# Patient Record
Sex: MALE | Race: ASIAN | NOT HISPANIC OR LATINO | URBAN - METROPOLITAN AREA
[De-identification: names, ages, dates, MRNs, and addresses within clinical notes are randomized per-mention and may not be internally consistent; named-entity substitution may affect disease eponyms.]

---

## 2018-06-25 ENCOUNTER — EMERGENCY (EMERGENCY)
Facility: HOSPITAL | Age: 43
LOS: 0 days | Discharge: HOME | End: 2018-06-25
Attending: EMERGENCY MEDICINE
Payer: COMMERCIAL

## 2018-06-25 VITALS
OXYGEN SATURATION: 98 % | DIASTOLIC BLOOD PRESSURE: 92 MMHG | SYSTOLIC BLOOD PRESSURE: 145 MMHG | RESPIRATION RATE: 20 BRPM | HEIGHT: 67 IN | HEART RATE: 107 BPM | WEIGHT: 190.04 LBS | TEMPERATURE: 100 F

## 2018-06-25 DIAGNOSIS — M54.9 DORSALGIA, UNSPECIFIED: ICD-10-CM

## 2018-06-25 DIAGNOSIS — S43.401A UNSPECIFIED SPRAIN OF RIGHT SHOULDER JOINT, INITIAL ENCOUNTER: ICD-10-CM
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
## 2018-06-25 DIAGNOSIS — M25.511 PAIN IN RIGHT SHOULDER: ICD-10-CM

## 2018-06-25 DIAGNOSIS — S39.012A STRAIN OF MUSCLE, FASCIA AND TENDON OF LOWER BACK, INITIAL ENCOUNTER: ICD-10-CM
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
## 2018-06-25 PROCEDURE — 99284 EMERGENCY DEPT VISIT MOD MDM: CPT

## 2018-06-25 PROCEDURE — 72100 X-RAY EXAM L-S SPINE 2/3 VWS: CPT | Mod: 26

## 2018-06-25 PROCEDURE — 73030 X-RAY EXAM OF SHOULDER: CPT | Mod: 26,RT

## 2018-06-25 RX ORDER — METHOCARBAMOL 500 MG/1
1 TABLET, FILM COATED ORAL
Qty: 14 | Refills: 0 | OUTPATIENT
Start: 2018-06-25 | End: 2018-07-01

## 2018-06-25 RX ORDER — METHOCARBAMOL 500 MG/1
750 TABLET, FILM COATED ORAL ONCE
Qty: 0 | Refills: 0 | Status: COMPLETED | OUTPATIENT
Start: 2018-06-25 | End: 2018-06-25

## 2018-06-25 RX ORDER — KETOROLAC TROMETHAMINE 30 MG/ML
30 SYRINGE (ML) INJECTION ONCE
Qty: 0 | Refills: 0 | Status: DISCONTINUED | OUTPATIENT
Start: 2018-06-25 | End: 2018-06-25

## 2018-06-25 RX ADMIN — METHOCARBAMOL 750 MILLIGRAM(S): 500 TABLET, FILM COATED ORAL at 17:26

## 2018-06-25 RX ADMIN — Medication 30 MILLIGRAM(S): at 17:53

## 2018-06-25 RX ADMIN — Medication 30 MILLIGRAM(S): at 17:26
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
## 2018-06-25 NOTE — ED PROVIDER NOTE - MUSCULOSKELETAL, MLM
Spine appears normal, range of motion is not limited, right anterior shoulder and right lateral lumbar tenderness
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
## 2019-06-24 NOTE — ED PROVIDER NOTE - GASTROINTESTINAL, MLM
Normal rate, regular rhythm, normal S1, S2 heart sounds heard. Abdomen soft, non-tender, no guarding.

## 2019-09-06 ENCOUNTER — EMERGENCY (EMERGENCY)
Facility: HOSPITAL | Age: 44
LOS: 0 days | Discharge: HOME | End: 2019-09-06
Attending: EMERGENCY MEDICINE
Payer: SELF-PAY

## 2019-09-06 VITALS
TEMPERATURE: 98 F | RESPIRATION RATE: 18 BRPM | OXYGEN SATURATION: 98 % | SYSTOLIC BLOOD PRESSURE: 130 MMHG | HEART RATE: 100 BPM | DIASTOLIC BLOOD PRESSURE: 89 MMHG

## 2019-09-06 VITALS — RESPIRATION RATE: 20 BRPM | HEART RATE: 109 BPM | TEMPERATURE: 99 F | OXYGEN SATURATION: 98 %

## 2019-09-06 DIAGNOSIS — Z63.0 PROBLEMS IN RELATIONSHIP WITH SPOUSE OR PARTNER: ICD-10-CM

## 2019-09-06 DIAGNOSIS — F32.2 MAJOR DEPRESSIVE DISORDER, SINGLE EPISODE, SEVERE WITHOUT PSYCHOTIC FEATURES: ICD-10-CM

## 2019-09-06 DIAGNOSIS — F32.9 MAJOR DEPRESSIVE DISORDER, SINGLE EPISODE, UNSPECIFIED: ICD-10-CM

## 2019-09-06 DIAGNOSIS — R73.9 HYPERGLYCEMIA, UNSPECIFIED: ICD-10-CM

## 2019-09-06 LAB
ALBUMIN SERPL ELPH-MCNC: 3.8 G/DL — SIGNIFICANT CHANGE UP (ref 3.3–5)
ALP SERPL-CCNC: 81 U/L — SIGNIFICANT CHANGE UP (ref 40–120)
ALT FLD-CCNC: 19 U/L — SIGNIFICANT CHANGE UP (ref 12–78)
AMPHET UR-MCNC: NEGATIVE — SIGNIFICANT CHANGE UP
ANION GAP SERPL CALC-SCNC: 10 MMOL/L — SIGNIFICANT CHANGE UP (ref 5–17)
APAP SERPL-MCNC: < 2 UG/ML (ref 10–30)
APPEARANCE UR: CLEAR — SIGNIFICANT CHANGE UP
AST SERPL-CCNC: 9 U/L — LOW (ref 15–37)
BARBITURATES UR SCN-MCNC: NEGATIVE — SIGNIFICANT CHANGE UP
BASOPHILS # BLD AUTO: 0.02 K/UL — SIGNIFICANT CHANGE UP (ref 0–0.2)
BASOPHILS NFR BLD AUTO: 0.3 % — SIGNIFICANT CHANGE UP (ref 0–2)
BENZODIAZ UR-MCNC: NEGATIVE — SIGNIFICANT CHANGE UP
BILIRUB SERPL-MCNC: 0.4 MG/DL — SIGNIFICANT CHANGE UP (ref 0.2–1.2)
BILIRUB UR-MCNC: NEGATIVE — SIGNIFICANT CHANGE UP
BUN SERPL-MCNC: 10 MG/DL — SIGNIFICANT CHANGE UP (ref 7–23)
CALCIUM SERPL-MCNC: 8.8 MG/DL — SIGNIFICANT CHANGE UP (ref 8.5–10.1)
CHLORIDE SERPL-SCNC: 101 MMOL/L — SIGNIFICANT CHANGE UP (ref 96–108)
CO2 SERPL-SCNC: 25 MMOL/L — SIGNIFICANT CHANGE UP (ref 22–31)
COCAINE METAB.OTHER UR-MCNC: NEGATIVE — SIGNIFICANT CHANGE UP
COLOR SPEC: YELLOW — SIGNIFICANT CHANGE UP
CREAT SERPL-MCNC: 1 MG/DL — SIGNIFICANT CHANGE UP (ref 0.5–1.3)
DIFF PNL FLD: NEGATIVE — SIGNIFICANT CHANGE UP
EOSINOPHIL # BLD AUTO: 0.16 K/UL — SIGNIFICANT CHANGE UP (ref 0–0.5)
EOSINOPHIL NFR BLD AUTO: 2.5 % — SIGNIFICANT CHANGE UP (ref 0–6)
ETHANOL SERPL-MCNC: <10 MG/DL — SIGNIFICANT CHANGE UP (ref 0–10)
GLUCOSE BLDC GLUCOMTR-MCNC: 309 MG/DL — HIGH (ref 70–99)
GLUCOSE SERPL-MCNC: 431 MG/DL — HIGH (ref 70–99)
GLUCOSE UR QL: 1000 MG/DL
HCT VFR BLD CALC: 43.4 % — SIGNIFICANT CHANGE UP (ref 39–50)
HGB BLD-MCNC: 15 G/DL — SIGNIFICANT CHANGE UP (ref 13–17)
IMM GRANULOCYTES NFR BLD AUTO: 0.3 % — SIGNIFICANT CHANGE UP (ref 0–1.5)
KETONES UR-MCNC: ABNORMAL
LEUKOCYTE ESTERASE UR-ACNC: NEGATIVE — SIGNIFICANT CHANGE UP
LYMPHOCYTES # BLD AUTO: 2.61 K/UL — SIGNIFICANT CHANGE UP (ref 1–3.3)
LYMPHOCYTES # BLD AUTO: 40.2 % — SIGNIFICANT CHANGE UP (ref 13–44)
MCHC RBC-ENTMCNC: 29.8 PG — SIGNIFICANT CHANGE UP (ref 27–34)
MCHC RBC-ENTMCNC: 34.6 GM/DL — SIGNIFICANT CHANGE UP (ref 32–36)
MCV RBC AUTO: 86.3 FL — SIGNIFICANT CHANGE UP (ref 80–100)
METHADONE UR-MCNC: NEGATIVE — SIGNIFICANT CHANGE UP
MONOCYTES # BLD AUTO: 0.35 K/UL — SIGNIFICANT CHANGE UP (ref 0–0.9)
MONOCYTES NFR BLD AUTO: 5.4 % — SIGNIFICANT CHANGE UP (ref 2–14)
NEUTROPHILS # BLD AUTO: 3.34 K/UL — SIGNIFICANT CHANGE UP (ref 1.8–7.4)
NEUTROPHILS NFR BLD AUTO: 51.3 % — SIGNIFICANT CHANGE UP (ref 43–77)
NITRITE UR-MCNC: NEGATIVE — SIGNIFICANT CHANGE UP
NRBC # BLD: 0 /100 WBCS — SIGNIFICANT CHANGE UP (ref 0–0)
OPIATES UR-MCNC: NEGATIVE — SIGNIFICANT CHANGE UP
PCP SPEC-MCNC: SIGNIFICANT CHANGE UP
PCP UR-MCNC: NEGATIVE — SIGNIFICANT CHANGE UP
PH UR: 7 — SIGNIFICANT CHANGE UP (ref 5–8)
PLATELET # BLD AUTO: 251 K/UL — SIGNIFICANT CHANGE UP (ref 150–400)
POTASSIUM SERPL-MCNC: 4.7 MMOL/L — SIGNIFICANT CHANGE UP (ref 3.5–5.3)
POTASSIUM SERPL-SCNC: 4.7 MMOL/L — SIGNIFICANT CHANGE UP (ref 3.5–5.3)
PROT SERPL-MCNC: 7.8 GM/DL — SIGNIFICANT CHANGE UP (ref 6–8.3)
PROT UR-MCNC: NEGATIVE MG/DL — SIGNIFICANT CHANGE UP
RBC # BLD: 5.03 M/UL — SIGNIFICANT CHANGE UP (ref 4.2–5.8)
RBC # FLD: 11.9 % — SIGNIFICANT CHANGE UP (ref 10.3–14.5)
SALICYLATES SERPL-MCNC: <1.7 MG/DL — LOW (ref 2.8–20)
SODIUM SERPL-SCNC: 136 MMOL/L — SIGNIFICANT CHANGE UP (ref 135–145)
SP GR SPEC: 1.01 — SIGNIFICANT CHANGE UP (ref 1.01–1.02)
THC UR QL: NEGATIVE — SIGNIFICANT CHANGE UP
TSH SERPL-MCNC: 1.35 UU/ML — SIGNIFICANT CHANGE UP (ref 0.36–3.74)
UROBILINOGEN FLD QL: NEGATIVE MG/DL — SIGNIFICANT CHANGE UP
WBC # BLD: 6.5 K/UL — SIGNIFICANT CHANGE UP (ref 3.8–10.5)
WBC # FLD AUTO: 6.5 K/UL — SIGNIFICANT CHANGE UP (ref 3.8–10.5)

## 2019-09-06 PROCEDURE — 90792 PSYCH DIAG EVAL W/MED SRVCS: CPT | Mod: GT

## 2019-09-06 PROCEDURE — 93010 ELECTROCARDIOGRAM REPORT: CPT

## 2019-09-06 PROCEDURE — 99284 EMERGENCY DEPT VISIT MOD MDM: CPT

## 2019-09-06 RX ORDER — METFORMIN HYDROCHLORIDE 850 MG/1
500 TABLET ORAL ONCE
Refills: 0 | Status: COMPLETED | OUTPATIENT
Start: 2019-09-06 | End: 2019-09-06

## 2019-09-06 RX ORDER — INSULIN HUMAN 100 [IU]/ML
5 INJECTION, SOLUTION SUBCUTANEOUS ONCE
Refills: 0 | Status: DISCONTINUED | OUTPATIENT
Start: 2019-09-06 | End: 2019-09-06

## 2019-09-06 RX ORDER — METFORMIN HYDROCHLORIDE 850 MG/1
1 TABLET ORAL
Qty: 30 | Refills: 0
Start: 2019-09-06 | End: 2019-09-20

## 2019-09-06 RX ADMIN — METFORMIN HYDROCHLORIDE 500 MILLIGRAM(S): 850 TABLET ORAL at 17:37

## 2019-09-06 SDOH — SOCIAL STABILITY - SOCIAL INSECURITY: PROBLEMS IN RELATIONSHIP WITH SPOUSE OR PARTNER: Z63.0

## 2019-09-06 NOTE — ED ADULT TRIAGE NOTE - CHIEF COMPLAINT QUOTE
Pt walked c/o feeling depressed for the pat 2 weeks getting worse, pt states that his wife recently  left him on 8/25/19,  pt also states that he's been having suicidal thoughts denies any active plans

## 2019-09-06 NOTE — ED PROVIDER NOTE - PATIENT PORTAL LINK FT
You can access the FollowMyHealth Patient Portal offered by [facility] by registering at the following website: [email or web address]. By joining [facility]’s FollowMyHealth portal, you will also be able to view your health information using other applications (apps) compatible with our system.

## 2019-09-06 NOTE — ED BEHAVIORAL HEALTH ASSESSMENT NOTE - RISK ASSESSMENT
Acute Suicide Risk  (  ) High   ( x ) Moderate   (  ) Low   (  ) Unable to determine   Rationale: Details: Risks include recent psychosocial stressors, minimal social supports, untreated depression/anxiety symptoms, poor sleep. However he appears to be treatment seeking as he presented himself to the hospital today asking for help, seems motivated to follow recommendations and seek outpatient mental health treatment in community, willing to increase supports from his son who will be picking him up from the hospital today    Elevated Chronic Risk   (  ) Yes ___________  Details ___________  (  ) No   ___________    Additional Suicide Risk Factors (select all that apply)  [  ]Access to lethal means including firearms  [  ]Family history of suicide  [  ]Impulsivity  [  ] Current or past mood disorder  [  ] Current or past psychotic disorder  [  ] Current or past PTSD  [  ] Current or past ADHD  [  ] Current or past TBI  [  ] Current or past cluster B personality disorder or traits  [  ] Current or past conduct problems  [  ] Recent onset of current or past psychiatric disorder  [  ] Family history of psychiatric diagnoses requiring hospitalization     Additional Activating Events (select all that apply)  [  ]Perceived burden on family or others  [  ]Current sexual or physical abuse  [  ]Substance intoxication or withdrawal  [  ]Inadequate social supports  [  ]Hopeless about or dissatisfied with current provider or treatment     Additional Protective Factors (select all that apply)  [  ] Future plans  [  ] Church beliefs  [  ] Beloved pets Acute Suicide Risk  (  ) High   ( x ) Moderate   (  ) Low   (  ) Unable to determine   Rationale: Details: Risks include recent psychosocial stressors, minimal social supports, untreated depression/anxiety symptoms, poor sleep. However he appears to be treatment seeking as he presented himself to the hospital today asking for help, seems motivated to follow recommendations and seek outpatient mental health treatment in community, willing to increase supports from his son who will be picking him up from the hospital today    Elevated Chronic Risk   ( x ) Yes ___________  Details: age, male gender, single  (  ) No   ___________    Additional Suicide Risk Factors (select all that apply)  [  ]Access to lethal means including firearms  [  ]Family history of suicide  [  ]Impulsivity  [  ] Current or past mood disorder  [  ] Current or past psychotic disorder  [  ] Current or past PTSD  [  ] Current or past ADHD  [  ] Current or past TBI  [  ] Current or past cluster B personality disorder or traits  [  ] Current or past conduct problems  [  ] Recent onset of current or past psychiatric disorder  [  ] Family history of psychiatric diagnoses requiring hospitalization     Additional Activating Events (select all that apply)  [  ]Perceived burden on family or others  [  ]Current sexual or physical abuse  [  ]Substance intoxication or withdrawal  [  ]Inadequate social supports  [  ]Hopeless about or dissatisfied with current provider or treatment     Additional Protective Factors (select all that apply)  [ x ] Future plans  [ x ] Shinto beliefs  [  ] Beloved pets

## 2019-09-06 NOTE — ED PROVIDER NOTE - OBJECTIVE STATEMENT
44 year old male who denies PMH presenting to ED due to depression - states has been feeling sad since his wife left him 2-3 weeks ago with another man. Pt works as a  and has no friend or family to speak with about this issue - his son is in his 20s but he has not spoke to him about issue. Has been having feeling of sadness, anger and thoughts of self harm without any plans at time to harm himself.

## 2019-09-06 NOTE — ED ADULT NURSE NOTE - OBJECTIVE STATEMENT
Pt a&o x3, pt came to ER today c/o of thoughts of suicide and depression. As per pt " I feel like I want to kill myself". Pt states depression began 2 weeks ago. Pt states that his wife recently  left him on 8/25/19 he is now living home alone. Pmh Lumb ar spine surgery 4 months ago. pt Denies any active plan, homicidal thoughts, previous pysch history, any previous suicidal attempt. Pt appears calm , cooperative , maintains good eye contact. 1:1 initiate dpt in gown, belongings collected and security paged. Pt denies any hallucinations

## 2019-09-06 NOTE — ED BEHAVIORAL HEALTH ASSESSMENT NOTE - MODIFICATIONS
provider wrote this note and saw patient, accompanied by medical student who conducted some of the interview

## 2019-09-06 NOTE — ED BEHAVIORAL HEALTH ASSESSMENT NOTE - HPI (INCLUDE ILLNESS QUALITY, SEVERITY, DURATION, TIMING, CONTEXT, MODIFYING FACTORS, ASSOCIATED SIGNS AND SYMPTOMS)
Pt is a 43yo Jordanian  (but ) male, domiciled alone, employed by [employer], has one son, one daughter, one step son, no significant past psychiatric history, no significant medical history, who self presents to ED for depression and anxiety symptoms including passive suicidal ideation in the setting of marital conflict, recent separation from his wife about two weeks ago. Reports distressing symptoms starting about 2-3 weeks ago including anhedonia, hopelessness, sadness, tearfulness, disruption of sleep, rumination/worry, anxiety/restlessness, passive thoughts of "not wanting to go on," but denies intent, denies plan, stating that he has "too many responsibilities" to family, as primary income in family, supporting his daughter through medical school in [place], etc. Reports since 2017, patient and his wife have been drifting apart, having conflicts around her change in lifestyle -- reportedly in 2017 she started using drugs and went to detention briefly. Patient reports he is Congregational, has never used alcohol or any substances. About two weeks ago, he discovered his wife was with another man, which triggered this current decompensation. He denies AVH, denies HI, denies manic symptoms. Not currently seeing psychiatrist or on psych meds.     Collateral from patient's son Donis Silverman [phone]: was aware that his father was having a very difficult time but was surprised he went to ED. does not feel his father would do anything to hurt himself because he feels so much responsibility toward his family. denies knowledge about any past self harming or SI, past psych history or other acute concerns. agrees to meet father at the ED and spend time with patient and monitor for changes or concerns.

## 2019-09-06 NOTE — ED BEHAVIORAL HEALTH ASSESSMENT NOTE - SUICIDE PROTECTIVE FACTORS
Responsibility to family and others/Supportive social network or family/High spirituality/Identifies reasons for living/Future oriented/Engaged in work or school

## 2019-09-06 NOTE — ED BEHAVIORAL HEALTH ASSESSMENT NOTE - DETAILS
previously lived with wife,  two weeks ago, now lives alone no SI in past, no past self harm, no past suicide attempts none discussed plan

## 2019-09-06 NOTE — ED BEHAVIORAL HEALTH ASSESSMENT NOTE - SUMMARY
43yo   (but ) male, domiciled alone, employed by [employer], has one son, one daughter, one step son, no significant past psychiatric history, no significant medical history, who self presents to ED for depression and anxiety symptoms including passive suicidal ideation in the setting of marital conflict, recent separation from his wife about two weeks ago. Patient does appear depressed, anxious, asking for help with sleep in particular. No suicide intent or plan, reports that he has too many responsibilities to his family as primary income. Upon speaking with his adult son, collateral is reassuring that psych hospitalization would be encouraged but since patient declines, outpatient mental health care would be appropriate. Discussed with patient and patient's son that should there be any concern for decompensation, SI, etc, to call 911 or go to nearest ED for help. Currently at moderate risk for suicide 45yo   (but ) male, domiciled alone, employed by [employer], has one son, one daughter, one step son, no significant past psychiatric history, no significant medical history, who self presents to ED for depression and anxiety symptoms including passive suicidal ideation in the setting of marital conflict, recent separation from his wife about two weeks ago. Patient does appear depressed, anxious, asking for help with sleep in particular. No suicide intent or plan, reports that he has too many responsibilities to his family as primary income. Upon speaking with his adult son, collateral is reassuring that psych hospitalization would be encouraged but since patient declines, outpatient mental health care would be appropriate. Discussed with patient and patient's son that should there be any concern for decompensation, SI, etc, to call 911 or go to nearest ED for help. Currently at moderate risk for suicide due to recent psychosocial stressors, minimal social supports, untreated depression/anxiety symptoms, poor sleep. However he appears to be treatment seeking as he presented himself to the hospital today asking for help, seems motivated to follow recommendations and seek outpatient mental health treatment in community, willing to increase supports from his son who will be picking him up from the hospital today.

## 2019-09-06 NOTE — ED BEHAVIORAL HEALTH ASSESSMENT NOTE - DIFFERENTIAL
C-SSRS Screener     1. Have you ever wished to be dead or wished you could go to sleep and not wake up?  [ x ]Yes, [  ]No, [  ]Unable to Assess     2. Have you actually had any thoughts of killing yourself?   [  ]Yes, [ x ]No, [  ]Unable to Assess     If answer is “No” for 1 and 2, stop here. If answer is “Yes” to 1 or 2, proceed to 3.     3. Have you been thinking about how you might kill yourself?  [  ]Yes, [ x ]No, [  ]Unable to Assess     4. Have you had these thoughts and had some intention of acting on them?  [  ]Yes, [ x ]No, [  ]Unable to Assess     5. Have you started to work out or worked out the details of how to kill yourself? Do you intend to carry out this plan?  [  ]Yes, [ x ]No, [  ]Unable to Assess     6. Have you ever done anything, started to do anything, or prepared to do anything to end your life? If so, was it in the past 3 months?  [  ]Yes, [ x ]No, [  ]Unable to Assess

## 2019-09-06 NOTE — ED BEHAVIORAL HEALTH ASSESSMENT NOTE - DESCRIPTION
Pt cooperative in ED. Self presented. Did not require any psych medications in ED. Following instructions. ?DM2, ?HTN Lived with wife of 10 years until she moved out abruptly about two weeks ago, the two are  apparently. Has two twin children, 21yo son and daughter, also 11yo step son. Now patient lives alone. Works as [employer]  for past ten years. Other family including daughter live in [place]. He is a practicing Rastafarian.

## 2019-09-06 NOTE — ED PROVIDER NOTE - CLINICAL SUMMARY MEDICAL DECISION MAKING FREE TEXT BOX
pt with suicidal ideation noted - tele-psych to evaluate - pt with suicidal ideation noted - tele-psych to evaluate - states may follow up out patient for suicidal thoughts and depression. Will also give follow up for new onset DM II without anion gap, will start on Metformin 500mg BID.

## 2019-09-06 NOTE — ED BEHAVIORAL HEALTH ASSESSMENT NOTE - OTHER PAST PSYCHIATRIC HISTORY (INCLUDE DETAILS REGARDING ONSET, COURSE OF ILLNESS, INPATIENT/OUTPATIENT TREATMENT)
No past psych history. No psychotropic meds in past although on PSYCKES, sertraline and paroxetine prescribed in 2015-2016Patient reports not recalling ever being prescribed or taking these medications. No psych hospitalizations in past.

## 2019-10-01 PROBLEM — Z00.00 ENCOUNTER FOR PREVENTIVE HEALTH EXAMINATION: Status: ACTIVE | Noted: 2019-10-01

## 2019-10-02 ENCOUNTER — APPOINTMENT (OUTPATIENT)
Dept: INTERNAL MEDICINE | Facility: CLINIC | Age: 44
End: 2019-10-02

## 2019-11-18 ENCOUNTER — APPOINTMENT (OUTPATIENT)
Dept: INTERNAL MEDICINE | Facility: CLINIC | Age: 44
End: 2019-11-18

## 2022-01-27 NOTE — ED ADULT NURSE NOTE - NS TRANSFER PATIENT BELONGINGS
Frequent recurrent febrile episodes of otitis not clearing with antibiotics    Recommend BMT Clothing

## 2022-05-06 NOTE — ED BEHAVIORAL HEALTH ASSESSMENT NOTE - MUSCLE TONE / STRENGTH
Elidel Pregnancy And Lactation Text: This medication is Pregnancy Category C. It is unknown if this medication is excreted in breast milk. Normal muscle tone/strength

## 2022-09-20 ENCOUNTER — EMERGENCY (EMERGENCY)
Facility: HOSPITAL | Age: 47
LOS: 0 days | Discharge: HOME | End: 2022-09-20
Attending: EMERGENCY MEDICINE

## 2022-09-20 ENCOUNTER — TRANSCRIPTION ENCOUNTER (OUTPATIENT)
Age: 47
End: 2022-09-20

## 2022-09-20 VITALS
DIASTOLIC BLOOD PRESSURE: 60 MMHG | RESPIRATION RATE: 16 BRPM | TEMPERATURE: 98 F | SYSTOLIC BLOOD PRESSURE: 112 MMHG | OXYGEN SATURATION: 99 % | HEART RATE: 74 BPM

## 2022-09-20 VITALS
RESPIRATION RATE: 18 BRPM | HEIGHT: 67 IN | WEIGHT: 179.9 LBS | DIASTOLIC BLOOD PRESSURE: 90 MMHG | SYSTOLIC BLOOD PRESSURE: 133 MMHG | HEART RATE: 135 BPM | OXYGEN SATURATION: 96 % | TEMPERATURE: 101 F

## 2022-09-20 LAB
ALBUMIN SERPL ELPH-MCNC: 3.7 G/DL — SIGNIFICANT CHANGE UP (ref 3.3–5)
ALP SERPL-CCNC: 76 U/L — SIGNIFICANT CHANGE UP (ref 40–120)
ALT FLD-CCNC: 27 U/L — SIGNIFICANT CHANGE UP (ref 12–78)
ANION GAP SERPL CALC-SCNC: 7 MMOL/L — SIGNIFICANT CHANGE UP (ref 5–17)
APPEARANCE UR: CLEAR — SIGNIFICANT CHANGE UP
APTT BLD: 32.8 SEC — SIGNIFICANT CHANGE UP (ref 27.5–35.5)
AST SERPL-CCNC: 21 U/L — SIGNIFICANT CHANGE UP (ref 15–37)
BASE EXCESS BLDV CALC-SCNC: -0.2 MMOL/L — SIGNIFICANT CHANGE UP (ref -2–3)
BASOPHILS # BLD AUTO: 0.02 K/UL — SIGNIFICANT CHANGE UP (ref 0–0.2)
BASOPHILS NFR BLD AUTO: 0.2 % — SIGNIFICANT CHANGE UP (ref 0–2)
BILIRUB SERPL-MCNC: 0.3 MG/DL — SIGNIFICANT CHANGE UP (ref 0.2–1.2)
BILIRUB UR-MCNC: NEGATIVE — SIGNIFICANT CHANGE UP
BLOOD GAS COMMENTS, VENOUS: SIGNIFICANT CHANGE UP
BUN SERPL-MCNC: 6 MG/DL — LOW (ref 7–23)
CALCIUM SERPL-MCNC: 9.1 MG/DL — SIGNIFICANT CHANGE UP (ref 8.5–10.1)
CHLORIDE BLDV-SCNC: 103 MMOL/L — SIGNIFICANT CHANGE UP (ref 98–107)
CHLORIDE SERPL-SCNC: 106 MMOL/L — SIGNIFICANT CHANGE UP (ref 96–108)
CO2 BLDV-SCNC: 25 MMOL/L — SIGNIFICANT CHANGE UP (ref 22–26)
CO2 SERPL-SCNC: 24 MMOL/L — SIGNIFICANT CHANGE UP (ref 22–31)
COLOR SPEC: YELLOW — SIGNIFICANT CHANGE UP
CREAT SERPL-MCNC: 0.85 MG/DL — SIGNIFICANT CHANGE UP (ref 0.5–1.3)
D DIMER BLD IA.RAPID-MCNC: <150 NG/ML DDU — SIGNIFICANT CHANGE UP
DIFF PNL FLD: NEGATIVE — SIGNIFICANT CHANGE UP
EGFR: 107 ML/MIN/1.73M2 — SIGNIFICANT CHANGE UP
EOSINOPHIL # BLD AUTO: 0.1 K/UL — SIGNIFICANT CHANGE UP (ref 0–0.5)
EOSINOPHIL NFR BLD AUTO: 1 % — SIGNIFICANT CHANGE UP (ref 0–6)
GAS PNL BLDV: 133 MMOL/L — LOW (ref 136–145)
GAS PNL BLDV: SIGNIFICANT CHANGE UP
GAS PNL BLDV: SIGNIFICANT CHANGE UP
GLUCOSE BLDC GLUCOMTR-MCNC: 121 MG/DL — HIGH (ref 70–99)
GLUCOSE BLDV-MCNC: 147 MG/DL — HIGH (ref 65–95)
GLUCOSE SERPL-MCNC: 136 MG/DL — HIGH (ref 70–99)
GLUCOSE UR QL: NEGATIVE MG/DL — SIGNIFICANT CHANGE UP
HCO3 BLDV-SCNC: 24 MMOL/L — SIGNIFICANT CHANGE UP (ref 22–28)
HCT VFR BLD CALC: 42.2 % — SIGNIFICANT CHANGE UP (ref 39–50)
HCT VFR BLDA CALC: 43 % — SIGNIFICANT CHANGE UP (ref 37–47)
HGB BLD CALC-MCNC: 14.4 G/DL — SIGNIFICANT CHANGE UP (ref 12.6–17.4)
HGB BLD-MCNC: 14 G/DL — SIGNIFICANT CHANGE UP (ref 13–17)
IMM GRANULOCYTES NFR BLD AUTO: 0.3 % — SIGNIFICANT CHANGE UP (ref 0–0.9)
INR BLD: 1.15 RATIO — SIGNIFICANT CHANGE UP (ref 0.88–1.16)
KETONES UR-MCNC: NEGATIVE — SIGNIFICANT CHANGE UP
LACTATE BLDV-MCNC: 1.4 MMOL/L — HIGH (ref 0.56–1.39)
LACTATE SERPL-SCNC: 1.1 MMOL/L — SIGNIFICANT CHANGE UP (ref 0.7–2)
LEUKOCYTE ESTERASE UR-ACNC: NEGATIVE — SIGNIFICANT CHANGE UP
LYMPHOCYTES # BLD AUTO: 2.29 K/UL — SIGNIFICANT CHANGE UP (ref 1–3.3)
LYMPHOCYTES # BLD AUTO: 23.7 % — SIGNIFICANT CHANGE UP (ref 13–44)
MCHC RBC-ENTMCNC: 29.7 PG — SIGNIFICANT CHANGE UP (ref 27–34)
MCHC RBC-ENTMCNC: 33.2 G/DL — SIGNIFICANT CHANGE UP (ref 32–36)
MCV RBC AUTO: 89.4 FL — SIGNIFICANT CHANGE UP (ref 80–100)
MONOCYTES # BLD AUTO: 0.72 K/UL — SIGNIFICANT CHANGE UP (ref 0–0.9)
MONOCYTES NFR BLD AUTO: 7.4 % — SIGNIFICANT CHANGE UP (ref 2–14)
NEUTROPHILS # BLD AUTO: 6.51 K/UL — SIGNIFICANT CHANGE UP (ref 1.8–7.4)
NEUTROPHILS NFR BLD AUTO: 67.4 % — SIGNIFICANT CHANGE UP (ref 43–77)
NITRITE UR-MCNC: NEGATIVE — SIGNIFICANT CHANGE UP
NRBC # BLD: 0 /100 WBCS — SIGNIFICANT CHANGE UP (ref 0–0)
PCO2 BLDV: 37 MMHG — LOW (ref 42–55)
PH BLDV: 7.42 — SIGNIFICANT CHANGE UP (ref 7.32–7.43)
PH UR: 7 — SIGNIFICANT CHANGE UP (ref 5–8)
PLATELET # BLD AUTO: 238 K/UL — SIGNIFICANT CHANGE UP (ref 150–400)
PO2 BLDV: 52 MMHG — HIGH (ref 25–45)
POTASSIUM BLDV-SCNC: 4.3 MMOL/L — SIGNIFICANT CHANGE UP (ref 3.5–5.1)
POTASSIUM SERPL-MCNC: 3.9 MMOL/L — SIGNIFICANT CHANGE UP (ref 3.5–5.3)
POTASSIUM SERPL-SCNC: 3.9 MMOL/L — SIGNIFICANT CHANGE UP (ref 3.5–5.3)
PROT SERPL-MCNC: 7.9 GM/DL — SIGNIFICANT CHANGE UP (ref 6–8.3)
PROT UR-MCNC: NEGATIVE MG/DL — SIGNIFICANT CHANGE UP
PROTHROM AB SERPL-ACNC: 13.7 SEC — HIGH (ref 10.5–13.4)
RAPID RVP RESULT: SIGNIFICANT CHANGE UP
RBC # BLD: 4.72 M/UL — SIGNIFICANT CHANGE UP (ref 4.2–5.8)
RBC # FLD: 12.8 % — SIGNIFICANT CHANGE UP (ref 10.3–14.5)
SAO2 % BLDV: 84.9 % — LOW (ref 94–98)
SARS-COV-2 RNA SPEC QL NAA+PROBE: SIGNIFICANT CHANGE UP
SODIUM SERPL-SCNC: 137 MMOL/L — SIGNIFICANT CHANGE UP (ref 135–145)
SP GR SPEC: 1 — LOW (ref 1.01–1.02)
TROPONIN I, HIGH SENSITIVITY RESULT: 10.7 NG/L — SIGNIFICANT CHANGE UP
TROPONIN I, HIGH SENSITIVITY RESULT: 11.3 NG/L — SIGNIFICANT CHANGE UP
UROBILINOGEN FLD QL: NEGATIVE MG/DL — SIGNIFICANT CHANGE UP
WBC # BLD: 9.67 K/UL — SIGNIFICANT CHANGE UP (ref 3.8–10.5)
WBC # FLD AUTO: 9.67 K/UL — SIGNIFICANT CHANGE UP (ref 3.8–10.5)

## 2022-09-20 PROCEDURE — 71045 X-RAY EXAM CHEST 1 VIEW: CPT | Mod: 26

## 2022-09-20 PROCEDURE — 99285 EMERGENCY DEPT VISIT HI MDM: CPT

## 2022-09-20 PROCEDURE — 93010 ELECTROCARDIOGRAM REPORT: CPT

## 2022-09-20 RX ORDER — SODIUM CHLORIDE 9 MG/ML
2500 INJECTION INTRAMUSCULAR; INTRAVENOUS; SUBCUTANEOUS ONCE
Refills: 0 | Status: COMPLETED | OUTPATIENT
Start: 2022-09-20 | End: 2022-09-20

## 2022-09-20 RX ORDER — PIPERACILLIN AND TAZOBACTAM 4; .5 G/20ML; G/20ML
3.38 INJECTION, POWDER, LYOPHILIZED, FOR SOLUTION INTRAVENOUS ONCE
Refills: 0 | Status: COMPLETED | OUTPATIENT
Start: 2022-09-20 | End: 2022-09-20

## 2022-09-20 RX ORDER — KETOROLAC TROMETHAMINE 30 MG/ML
15 SYRINGE (ML) INJECTION ONCE
Refills: 0 | Status: DISCONTINUED | OUTPATIENT
Start: 2022-09-20 | End: 2022-09-20

## 2022-09-20 RX ORDER — VANCOMYCIN HCL 1 G
1000 VIAL (EA) INTRAVENOUS ONCE
Refills: 0 | Status: COMPLETED | OUTPATIENT
Start: 2022-09-20 | End: 2022-09-20

## 2022-09-20 RX ORDER — ACETAMINOPHEN 500 MG
1000 TABLET ORAL ONCE
Refills: 0 | Status: COMPLETED | OUTPATIENT
Start: 2022-09-20 | End: 2022-09-20

## 2022-09-20 RX ORDER — VANCOMYCIN HCL 1 G
1200 VIAL (EA) INTRAVENOUS ONCE
Refills: 0 | Status: DISCONTINUED | OUTPATIENT
Start: 2022-09-20 | End: 2022-09-20

## 2022-09-20 RX ADMIN — Medication 15 MILLIGRAM(S): at 10:24

## 2022-09-20 RX ADMIN — Medication 250 MILLIGRAM(S): at 07:41

## 2022-09-20 RX ADMIN — SODIUM CHLORIDE 2500 MILLILITER(S): 9 INJECTION INTRAMUSCULAR; INTRAVENOUS; SUBCUTANEOUS at 08:12

## 2022-09-20 RX ADMIN — Medication 1000 MILLIGRAM(S): at 08:11

## 2022-09-20 RX ADMIN — PIPERACILLIN AND TAZOBACTAM 3.38 GRAM(S): 4; .5 INJECTION, POWDER, LYOPHILIZED, FOR SOLUTION INTRAVENOUS at 08:11

## 2022-09-20 RX ADMIN — PIPERACILLIN AND TAZOBACTAM 200 GRAM(S): 4; .5 INJECTION, POWDER, LYOPHILIZED, FOR SOLUTION INTRAVENOUS at 08:06

## 2022-09-20 RX ADMIN — SODIUM CHLORIDE 2500 MILLILITER(S): 9 INJECTION INTRAMUSCULAR; INTRAVENOUS; SUBCUTANEOUS at 07:00

## 2022-09-20 RX ADMIN — Medication 400 MILLIGRAM(S): at 07:00

## 2022-09-20 NOTE — ED PROVIDER NOTE - PROGRESS NOTE DETAILS
NP Maite: repeat troponin WNL, labs unremarkable, chest xray clear. patient still with right sided throat pain and TTP. erythema noted to throat. considering fever will treat empirically for tonsilitis.

## 2022-09-20 NOTE — ED PROVIDER NOTE - PATIENT PORTAL LINK FT
You can access the FollowMyHealth Patient Portal offered by [facility] by registering at the following website: [email or web address]. By joining [facility]’s FollowMyHealth portal, you will also be able to view your health information using other applications (apps) compatible with our system.

## 2022-09-20 NOTE — ED PROVIDER NOTE - CLINICAL SUMMARY MEDICAL DECISION MAKING FREE TEXT BOX
48 y/o M pmhx DM diet controlled c/o fever x 3 days with throat pain since yesterday and chest pain that started this morning. he denies cough, n/v/d, abdominal pain, dysuria, low back pain. denies cardiac history. he also states that this morning he was getting up to the bathroom and felt lightheaded, denies headache, blurry vision. -- neuro exam normal. patient febrile and tachy. will check sepsis labs, treat emperically antibx. chest xray and reeval 48 y/o M pmhx DM diet controlled c/o fever x 3 days with throat pain since yesterday and chest pain that started this morning. he denies cough, n/v/d, abdominal pain, dysuria, low back pain. denies cardiac history. he also states that this morning he was getting up to the bathroom and felt lightheaded, denies headache, blurry vision. -- neuro exam normal. patient febrile and tachy. will check sepsis labs, treat emperically antibx. will add d-dimer considering chest pain and patient is a . chest xray and reeval 46 y/o M pmhx DM diet controlled c/o fever x 3 days with throat pain since yesterday and chest pain that started this morning. he denies cough, n/v/d, abdominal pain, dysuria, low back pain. denies cardiac history. he also states that this morning he was getting up to the bathroom and felt lightheaded, denies headache, blurry vision. -- neuro exam normal. patient febrile and tachy. will check sepsis labs, treat emperically antibx. will add d-dimer considering chest pain and patient is a . chest xray and reeval    Negative workup for fever in ED - otherwise will treat for possible pharyngitis with abx given pt is diabetic with fever, otherwise well appearing clinically at time of dc.
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
## 2022-09-20 NOTE — ED PROVIDER NOTE - PHYSICAL EXAMINATION
Gen: Awake, Alert, NAD  Head:  NC/AT  Eyes:  PERRL, EOMI, Conjunctiva pink, lids normal, no scleral icterus  ENT: OP clear, no exudates, dry mucus membranes, bilateral anterior neck tenderness   Neck: supple, nontender, no meningismus, no JVD, trachea midline  Cardiac/CV:  S1 S2, tachycardic, no M/G/R  Respiratory/Pulm:  CTAB, good air movement, normal resp effort, no wheezes/stridor/retractions/rales/rhonchi  Gastrointestinal/Abdomen:  Soft, non tender , nondistended, +BS, no rebound/guarding  Back:  no CVAT,  no MLT  Ext:  warm, well perfused, moving all extremities spontaneously, no peripheral edema, distal pulses intact  Skin: intact, no rash  Neuro:  AAOx3, sensation intact, motor 5/5 x 4 extremities, normal gait, speech clear

## 2022-09-20 NOTE — ED ADULT TRIAGE NOTE - CHIEF COMPLAINT QUOTE
complaining of fever for 2 days 103F at home, throat pain, dizziness, chest pain 4-5 hours ago. h/o DM

## 2022-09-20 NOTE — ED ADULT NURSE NOTE - OBJECTIVE STATEMENT
Patient c/o fever, chills, headache x 3 days with chest pain onset a few hours ago.  Denies recent travel, but works as an [employer] .

## 2022-09-20 NOTE — ED PROVIDER NOTE - NSFOLLOWUPINSTRUCTIONS_ED_ALL_ED_FT
Advance activity as tolerated.  Continue all previously prescribed medications as directed unless otherwise instructed.      Follow up with your primary care physician in 48-72 hours- bring copies of your results.      Return to the ER for worsening or persistent symptoms, and/or ANY NEW OR CONCERNING SYMPTOMS. If you have issues obtaining follow up, please call: [phone] to obtain a doctor or specialist who takes your insurance in your area.  You may call [phone] to make an appointment with the internal medicine clinic.     Take Augmentin, one pill, twice a day for 7 days.     Take Motrin 600 mg every 8 hours as needed for moderate pain or fevers -- take with food.     Take Tylenol 650mg (Two 325 mg pills) every 4-6 hours as needed for pain or fevers. \    Drink plenty of fluids        WHAT YOU NEED TO KNOW:    Pharyngitis, or sore throat, is inflammation of the tissues and structures in your pharynx (throat). Pharyngitis is most often caused by bacteria. It may also be caused by a cold or flu virus. Other causes include smoking, allergies, or acid reflux.     DISCHARGE INSTRUCTIONS:    Call your local emergency number (911 in the [place]) if:   •You have trouble breathing or swallowing because your throat is swollen or sore.          Return to the emergency department if:   •You are drooling because it hurts too much to swallow.      •Your fever is higher than 102°F (39°C) or lasts longer than 3 days.      •You are confused.      •You taste blood in your throat.      Call your doctor if:   •Your throat pain gets worse.      •You have a painful lump in your throat that does not go away after 5 days.      •Your symptoms do not improve after 5 days.      •You have questions or concerns about your condition or care.      Medicines: Viral pharyngitis will go away on its own without treatment. Your sore throat should start to feel better in 3 to 5 days for both viral and bacterial infections. You may need any of the following:   •Antibiotics treat a bacterial infection.      •NSAIDs, such as ibuprofen, help decrease swelling, pain, and fever. NSAIDs can cause stomach bleeding or kidney problems in certain people. If you take blood thinner medicine, always ask your healthcare provider if NSAIDs are safe for you. Always read the medicine label and follow directions.      •Acetaminophen decreases pain and fever. It is available without a doctor's order. Ask how much to take and how often to take it. Follow directions. Acetaminophen can cause liver damage if not taken correctly.      •Take your medicine as directed. Contact your healthcare provider if you think your medicine is not helping or if you have side effects. Tell your provider if you are allergic to any medicine. Keep a list of the medicines, vitamins, and herbs you take. Include the amounts, and when and why you take them. Bring the list or the pill bottles to follow-up visits. Carry your medicine list with you in case of an emergency.      Manage your symptoms:   •Gargle salt water. Mix ¼ teaspoon salt in an 8 ounce glass of warm water and gargle. This may help decrease swelling in your throat.      •Drink liquids as directed. You may need to drink more liquids than usual. Liquids may help soothe your throat and prevent dehydration. Ask how much liquid to drink each day and which liquids are best for you.      •Use a cool-steam humidifier to help moisten the air in your room and calm your cough.      •Soothe your throat with cough drops, ice, soft foods, or popsicles.      Prevent the spread of pharyngitis: Cover your mouth and nose when you cough or sneeze. Do not share food or drinks. Wash your hands often. Use soap and water. If soap and water are unavailable, use an alcohol based hand .     Follow up with your doctor as directed: Write down your questions so you remember to ask them during your visits

## 2022-09-20 NOTE — ED PROVIDER NOTE - OBJECTIVE STATEMENT
46 y/o M pmhx DM diet controlled c/o fever x 3 days with throat pain since yesterday and chest pain that started this morning. he denies cough, n/v/d, abdominal pain, dysuria, low back pain. denies cardiac history. he also states that this morning he was getting up to the bathroom and felt lightheaded, denies headache, blurry vision.

## 2022-09-21 DIAGNOSIS — R50.9 FEVER, UNSPECIFIED: ICD-10-CM

## 2022-09-21 DIAGNOSIS — R00.0 TACHYCARDIA, UNSPECIFIED: ICD-10-CM

## 2022-09-21 DIAGNOSIS — R07.89 OTHER CHEST PAIN: ICD-10-CM

## 2022-09-21 DIAGNOSIS — R42 DIZZINESS AND GIDDINESS: ICD-10-CM

## 2022-09-21 DIAGNOSIS — L53.8 OTHER SPECIFIED ERYTHEMATOUS CONDITIONS: ICD-10-CM

## 2022-09-21 DIAGNOSIS — Z20.822 CONTACT WITH AND (SUSPECTED) EXPOSURE TO COVID-19: ICD-10-CM

## 2022-09-21 DIAGNOSIS — R07.0 PAIN IN THROAT: ICD-10-CM

## 2022-09-21 DIAGNOSIS — Z79.84 LONG TERM (CURRENT) USE OF ORAL HYPOGLYCEMIC DRUGS: ICD-10-CM

## 2022-09-21 DIAGNOSIS — E11.9 TYPE 2 DIABETES MELLITUS WITHOUT COMPLICATIONS: ICD-10-CM

## 2022-09-21 LAB
CULTURE RESULTS: NO GROWTH — SIGNIFICANT CHANGE UP
SPECIMEN SOURCE: SIGNIFICANT CHANGE UP

## 2022-09-25 LAB
CULTURE RESULTS: SIGNIFICANT CHANGE UP
CULTURE RESULTS: SIGNIFICANT CHANGE UP
SPECIMEN SOURCE: SIGNIFICANT CHANGE UP
SPECIMEN SOURCE: SIGNIFICANT CHANGE UP

## 2022-10-05 NOTE — ED ADULT NURSE NOTE - NSFALLRSKPASTHIST_ED_ALL_ED
no